# Patient Record
Sex: FEMALE | Race: OTHER | HISPANIC OR LATINO | URBAN - METROPOLITAN AREA
[De-identification: names, ages, dates, MRNs, and addresses within clinical notes are randomized per-mention and may not be internally consistent; named-entity substitution may affect disease eponyms.]

---

## 2023-12-19 ENCOUNTER — OFFICE VISIT (OUTPATIENT)
Dept: CARDIOLOGY | Facility: CLINIC | Age: 43
End: 2023-12-19
Payer: COMMERCIAL

## 2023-12-19 VITALS
WEIGHT: 210.56 LBS | TEMPERATURE: 98 F | RESPIRATION RATE: 18 BRPM | BODY MASS INDEX: 41.34 KG/M2 | HEIGHT: 60 IN | DIASTOLIC BLOOD PRESSURE: 80 MMHG | OXYGEN SATURATION: 99 % | HEART RATE: 80 BPM | SYSTOLIC BLOOD PRESSURE: 133 MMHG

## 2023-12-19 DIAGNOSIS — R07.89 ATYPICAL CHEST PAIN: ICD-10-CM

## 2023-12-19 DIAGNOSIS — R55 VASOVAGAL SYNCOPE: Primary | ICD-10-CM

## 2023-12-19 PROCEDURE — 99203 OFFICE O/P NEW LOW 30 MIN: CPT | Mod: PBBFAC | Performed by: INTERNAL MEDICINE

## 2023-12-19 PROCEDURE — 93005 ELECTROCARDIOGRAM TRACING: CPT

## 2023-12-19 RX ORDER — ASPIRIN 81 MG/1
81 TABLET ORAL DAILY
COMMUNITY

## 2023-12-19 RX ORDER — ACETAMINOPHEN 500 MG
500 TABLET ORAL EVERY 6 HOURS PRN
COMMUNITY

## 2023-12-19 NOTE — PROGRESS NOTES
Subjective:   Patient ID:  Kya Jessica is a 43 y.o. female who presents for evaluation of referral for syncope episode  (C/o chest pain described as ache relieved with tylenol. )    Patient is a Faroese-speaking 43-year-old female with no significant past medical history who presents to [facility] for initial evaluation after syncopal episode approximately 1 month ago.  She states she was standing up with a group of people praying and suddenly passed out, she states she was out for a couple of minutes before waking up.  No postictal symptoms noted.  Patient states she had no prodrome prior to episode, no nausea, no dizziness, no other prior symptoms.  Patient states this never happened prior to this and it has not happened since.  Patient does state she has had 2 episodes of chest pain since this time, this pain is located left upper chest and radiates down arm.  She states this is reproducible to palpation.  She presented to doctor's office both times it has happened and it seemed to resolve with Tylenol.  Patient states she has no significant past medical history, only takes daily aspirin and occasional Tylenol.  She states she does have family history of heart disease.  Has not noted any new palpitations, no dyspnea on exertion.  No other complaints at this time      Review of Systems   Constitutional: Negative for chills and fever.   Cardiovascular:  Positive for syncope. Negative for dyspnea on exertion and irregular heartbeat.   Respiratory:  Negative for cough and shortness of breath.    Gastrointestinal:  Negative for abdominal pain.   Neurological:  Negative for numbness and seizures.     Objective:   Physical Exam  Constitutional:       Appearance: Normal appearance. She is obese.   HENT:      Head: Normocephalic and atraumatic.   Eyes:      Extraocular Movements: Extraocular movements intact.   Cardiovascular:      Rate and Rhythm: Normal rate and regular rhythm.      Pulses: Normal pulses.       Heart sounds: No murmur heard.  Pulmonary:      Effort: Pulmonary effort is normal. No respiratory distress.      Breath sounds: Normal breath sounds.   Abdominal:      Palpations: Abdomen is soft.      Tenderness: There is no abdominal tenderness.   Musculoskeletal:         General: Tenderness (Tenderness to palpation, left upper chest) present. Normal range of motion.      Right lower leg: No edema.      Left lower leg: No edema.   Skin:     General: Skin is warm.      Capillary Refill: Capillary refill takes less than 2 seconds.   Neurological:      General: No focal deficit present.      Mental Status: She is alert and oriented to person, place, and time.       Assessment:      1. Vasovagal syncope    2. Atypical chest pain        Plan:     Vasovagal syncope  Reproducible chest pain   -no prior cardiac history, does have significant family history  -EKG shows left bundle branch block, no overt ischemic findings  -will schedule patient for echocardiogram and treadmill stress test   -continue taking aspirin 81 mg daily   -should continue to increase activity as tolerated, maintain balanced diet  -patient to return to clinic in 4 months after testing bruce Hurd DO

## 2023-12-20 NOTE — PROGRESS NOTES
Cardiology Attending    I evaluated Kya Jessica and discussed the patient's symptoms, findings, and management plan with the resident.  Please see the Cardiology note for details.

## 2024-01-18 ENCOUNTER — HOSPITAL ENCOUNTER (OUTPATIENT)
Dept: CARDIOLOGY | Facility: HOSPITAL | Age: 44
Discharge: HOME | End: 2024-01-18
Attending: STUDENT IN AN ORGANIZED HEALTH CARE EDUCATION/TRAINING PROGRAM

## 2024-01-18 VITALS
DIASTOLIC BLOOD PRESSURE: 76 MMHG | HEIGHT: 60 IN | RESPIRATION RATE: 18 BRPM | SYSTOLIC BLOOD PRESSURE: 149 MMHG | SYSTOLIC BLOOD PRESSURE: 135 MMHG | HEIGHT: 60 IN | WEIGHT: 210.13 LBS | BODY MASS INDEX: 41.25 KG/M2 | DIASTOLIC BLOOD PRESSURE: 82 MMHG | WEIGHT: 210 LBS | HEART RATE: 61 BPM | BODY MASS INDEX: 41.23 KG/M2

## 2024-01-18 DIAGNOSIS — R07.89 ATYPICAL CHEST PAIN: ICD-10-CM

## 2024-01-18 DIAGNOSIS — R55 VASOVAGAL SYNCOPE: ICD-10-CM

## 2024-01-18 PROCEDURE — 93017 CV STRESS TEST TRACING ONLY: CPT

## 2024-01-18 PROCEDURE — 93306 TTE W/DOPPLER COMPLETE: CPT

## 2024-01-19 LAB
AV INDEX (PROSTH): 0.7
AV MEAN GRADIENT: 2 MMHG
AV PEAK GRADIENT: 4 MMHG
AV VALVE AREA BY VELOCITY RATIO: 2.1 CM²
AV VALVE AREA: 2.17 CM²
AV VELOCITY RATIO: 0.68
BSA FOR ECHO PROCEDURE: 2.01 M2
CV ECHO LV RWT: 0.29 CM
CV STRESS BASE HR: 62 BPM
DIASTOLIC BLOOD PRESSURE: 76 MMHG
DOP CALC AO PEAK VEL: 1.02 M/S
DOP CALC AO VTI: 23.5 CM
DOP CALC LVOT AREA: 3.1 CM2
DOP CALC LVOT DIAMETER: 1.99 CM
DOP CALC LVOT PEAK VEL: 0.69 M/S
DOP CALC LVOT STROKE VOLUME: 50.98 CM3
DOP CALC MV VTI: 30.4 CM
DOP CALCLVOT PEAK VEL VTI: 16.4 CM
E WAVE DECELERATION TIME: 184.88 MSEC
E/A RATIO: 2.19
ECHO LV POSTERIOR WALL: 0.76 CM (ref 0.6–1.1)
EJECTION FRACTION: 58 %
FRACTIONAL SHORTENING: 31 % (ref 28–44)
HR MV ECHO: 60 BPM
INTERVENTRICULAR SEPTUM: 0.87 CM (ref 0.6–1.1)
IVC DIAMETER: 1.5 CM
LEFT ATRIUM SIZE: 3.92 CM
LEFT ATRIUM VOLUME INDEX MOD: 13.7 ML/M2
LEFT ATRIUM VOLUME MOD: 26.15 CM3
LEFT INTERNAL DIMENSION IN SYSTOLE: 3.65 CM (ref 2.1–4)
LEFT VENTRICLE DIASTOLIC VOLUME INDEX: 70.27 ML/M2
LEFT VENTRICLE DIASTOLIC VOLUME: 134.22 ML
LEFT VENTRICLE MASS INDEX: 80 G/M2
LEFT VENTRICLE SYSTOLIC VOLUME INDEX: 29.4 ML/M2
LEFT VENTRICLE SYSTOLIC VOLUME: 56.13 ML
LEFT VENTRICULAR INTERNAL DIMENSION IN DIASTOLE: 5.28 CM (ref 3.5–6)
LEFT VENTRICULAR MASS: 152.64 G
LV LATERAL E/E' RATIO: 9.4 M/S
LVOT MG: 1.15 MMHG
LVOT MV: 0.51 CM/S
MV MEAN GRADIENT: 1 MMHG
MV PEAK A VEL: 0.43 M/S
MV PEAK E VEL: 0.94 M/S
MV PEAK GRADIENT: 4 MMHG
MV VALVE AREA BY CONTINUITY EQUATION: 1.68 CM2
OHS CV CPX 85 PERCENT MAX PREDICTED HEART RATE MALE: 150
OHS CV CPX ESTIMATED METS: 7
OHS CV CPX MAX PREDICTED HEART RATE: 176
OHS CV CPX PATIENT IS FEMALE: 1
OHS CV CPX PATIENT IS MALE: 0
OHS CV CPX PEAK DIASTOLIC BLOOD PRESSURE: 77 MMHG
OHS CV CPX PEAK HEAR RATE: 157 BPM
OHS CV CPX PEAK RATE PRESSURE PRODUCT: NORMAL
OHS CV CPX PEAK SYSTOLIC BLOOD PRESSURE: 169 MMHG
OHS CV CPX PERCENT MAX PREDICTED HEART RATE ACHIEVED: 94
OHS CV CPX RATE PRESSURE PRODUCT PRESENTING: 8370
OHS LV EJECTION FRACTION SIMPSONS BIPLANE MOD: 62 %
PISA MRMAX VEL: 5.1 M/S
PISA TR MAX VEL: 2.81 M/S
RA MAJOR: 4.26 CM
RA PRESSURE ESTIMATED: 3 MMHG
RV TB RVSP: 6 MMHG
STRESS ECHO POST EXERCISE DUR MIN: 4 MINUTES
STRESS ECHO POST EXERCISE DUR SEC: 2 SECONDS
SYSTOLIC BLOOD PRESSURE: 135 MMHG
TDI LATERAL: 0.1 M/S
TR MAX PG: 32 MMHG
TRICUSPID ANNULAR PLANE SYSTOLIC EXCURSION: 2.63 CM
TV REST PULMONARY ARTERY PRESSURE: 35 MMHG
Z-SCORE OF LEFT VENTRICULAR DIMENSION IN END DIASTOLE: -0.18
Z-SCORE OF LEFT VENTRICULAR DIMENSION IN END SYSTOLE: 0.77